# Patient Record
Sex: FEMALE | Race: WHITE | NOT HISPANIC OR LATINO | URBAN - METROPOLITAN AREA
[De-identification: names, ages, dates, MRNs, and addresses within clinical notes are randomized per-mention and may not be internally consistent; named-entity substitution may affect disease eponyms.]

---

## 2017-01-11 ENCOUNTER — OFFICE (OUTPATIENT)
Dept: URBAN - METROPOLITAN AREA CLINIC 78 | Facility: CLINIC | Age: 27
End: 2017-01-11

## 2017-01-11 VITALS
WEIGHT: 151 LBS | TEMPERATURE: 97.6 F | DIASTOLIC BLOOD PRESSURE: 82 MMHG | SYSTOLIC BLOOD PRESSURE: 120 MMHG | HEIGHT: 68 IN | HEART RATE: 74 BPM

## 2017-01-11 DIAGNOSIS — R10.31 RIGHT LOWER QUADRANT PAIN: ICD-10-CM

## 2017-01-11 DIAGNOSIS — R11.0 NAUSEA: ICD-10-CM

## 2017-01-11 DIAGNOSIS — K29.70 GASTRITIS, UNSPECIFIED, WITHOUT BLEEDING: ICD-10-CM

## 2017-01-11 DIAGNOSIS — K52.831 COLLAGENOUS COLITIS: ICD-10-CM

## 2017-01-11 PROCEDURE — 99214 OFFICE O/P EST MOD 30 MIN: CPT

## 2017-01-11 NOTE — SERVICEHPINOTES
LUCIA MONTEZ   is a   26   year old    female who is being seen in consultation at the request of   GRAEME OMALLEY   for follow up to EGD and colonoscopy. EGD 11/9/16-mild gastritis, negative H. pylori and negative celiac sprue.  She has decreased caffeine intake, now only mostly takes caffeine pills instead of drinking caffeinated drinks. This has decreased the nauseous. Denies GERD.  Colonoscopy 11/9/16-internal hemorrhoids, collagenous colitis. She reports constant RLQ pain varies in intensity. Varies between burning and stabbing pain. She has a BM usually twice daily. Stools are Brown stool scale type 6-7. No blood present. Diarrhea has greatly improved since having her colonoscopy. Her mother was also diagnosed with collagenous colitis at age 25. Notes occasional mucous. Uses preparation H prn which helps with hemorrhoids. She is a social smoker, uses Aleve prn back pain. Denies weight loss. She has sleep apnea, doesn't use the CPAP machine, because the mask is uncomfortable. That is why she relies on caffeine pills during the day.